# Patient Record
(demographics unavailable — no encounter records)

---

## 2025-05-20 NOTE — REVIEW OF SYSTEMS
[Fever] : no fever [Chills] : no chills [SOB] : no shortness of breath [Palpitations] : no palpitations

## 2025-05-20 NOTE — HISTORY OF PRESENT ILLNESS
[FreeTextEntry1] : RANDEE DIA is a 64 year old woman with pancytopenia, anemia, anal cancer 2023 s/p chemo/XRT last Tx 723, breast cancer 2019 Rx anastrozole, cirrhosis hypothyroidism and GERD She is physically active    These activities are well-tolerated without chest discomfort or shortness of breath.  She denies PND, orthopnea, peripheral edema, palpitations, dizziness, presyncope and syncope.   There is no history of hypertension, hyperlipidemia, diabetes, smoking or renal insufficiency. Family history is negative for coronary artery disease.   Diet is poor. She drinks 1 cup coffee, no alcohol. No cannabis. She does not snore.   There is no prior history of a clinical myocardial infarction, coronary revascularization, symptomatic congestive heart failure, rheumatic heart disease, valvular disease, symptomatic arrhythmias including atrial fibrillation.  There is no history of cerebrovascular events.  Laboratory 5/5/2025 sodium 142, K3.7, CA 9.1, alk phos 286, ALT 74, AST 77, iron 56, TIBC 403, hemoglobin 11.3, hematocrit 35.4%, PLT 89, EGFR 102.6